# Patient Record
Sex: MALE | Race: WHITE
[De-identification: names, ages, dates, MRNs, and addresses within clinical notes are randomized per-mention and may not be internally consistent; named-entity substitution may affect disease eponyms.]

---

## 2017-03-01 NOTE — PDOC H&P
History of Present Illness


Admission Date/PCP: 


  03/01/17 18:33





  CHRISTIAN NATHAN





History of Present Illness: 


SHARON NGUYEN is a 61 year old male with past medical history of ulcerative 

colitis, diabetes mellitus, hyperlipidemia, neuropathy, anemia, previous CVA 

who presents to the emergency department with chest pain.  Patient reports that 

he was walking in [employer] when he began having stabbing chest pain that then 

turned and pressure complaint nausea and without diaphoresis or shortness of 

breath.  Patient reports that he began having shakes of that he couldn't stand.

  He then reports that he did not loose consciousness.  He reports he's been 

having dizzy spells that have been evaluated over the past year by both 

cardiology as well as neurology.  She reports a prior stress test approximately 

one year ago that was negative.  Wife reports that he's also had echocardiogram

, stress test, Holter monitor, EEG.  Patient is referred to the hospital 

service for evaluation of chest pain.








Past Medical History


Past Medical History: 


ulcerative colitis, diabetes mellitus, hyperlipidemia, neuropathy, anemia, 

previous CVA 


Cardiac Medical History: Reports: Hyperlipidema


Endocrine Medical History: Reports: Diabetes Mellitus Type 2


Musculoskeltal Medical History: Reports: Arthritis


Psychiatric Medical History: Reports: Depression





Past Surgical History


Past Surgical History: 


Eye surgery





Social History


Smoking Status: Current Some Day Smoker


Cigarettes Packs Per Day: 1


Frequency of Alcohol Use: None


Amount of Alcoholic Beverages Per Day: history of alcohol abuse quit in 1998


Hx Recreational Drug Use: No


Drugs: None


Hx Prescription Drug Abuse: No





- Advance Directive


Resuscitation Status: Full Code


Surrogate healthcare decision maker:: 


Barbara Nguyen, wife





Family History


Family History: CAD, DM, Malignancy


Parental Family History Reviewed: Yes


Children Family History Reviewed: Yes


Sibling(s) Family History Reviewed.: Yes





Medication/Allergy


Home Medications: 








Bupropion HCl [Wellbutrin Sr 100 mg Tablet] 300 mg PO Q12 03/01/17 


Cyanocobalamin (Vitamin B-12) [Vitamin B-12] 1,000 mcg SL DAILY 03/01/17 


Duloxetine HCl [Cymbalta] 60 mg PO TID 03/01/17 


Ergocalciferol (Vitamin D2) [Vitamin D2] 50,000 units PO HAGAN@1000 03/01/17 


Ferrous Sulfate [Feosol 325 mg Tablet] 325 mg PO BIDPCBS 03/01/17 


Fluticasone Propionate [Flonase Nasal Spray 50 Mcg/Spray 16 gm] 1 spray NASL 

DAILY 03/01/17 


Mercaptopurine [Purinethol 50 mg Tablet] 75 mg PO DAILY 03/01/17 


Mesalamine [Delzicol] 800 mg PO BID 03/01/17 


Metformin HCl [Metformin HCl ER] 1,000 mg PO BIDPCBS 03/01/17 


Metoclopramide HCl [Reglan] 5 mg PO TIDHS 03/01/17 


Omeprazole 20 mg PO Q12 03/01/17 


Oxycodone HCl/Acetaminophen [Oxycodone-Acetaminophen ] 1 each PO TIDP PRN 

03/01/17 


Pregabalin [Lyrica] 200 mg PO Q8 03/01/17 


Tamsulosin HCl [Flomax 0.4 mg Cap.sr] 0.4 mg PO Q12 03/01/17 








Allergies/Adverse Reactions: 


 





No Known Allergies Allergy (Verified 03/01/17 13:47)


 











Review of Systems


Constitutional: PRESENT: weakness.  ABSENT: chills, fever(s), headache(s), 

weight gain, weight loss


Eyes: ABSENT: visual disturbances


Ears: ABSENT: hearing changes


Cardiovascular: PRESENT: as per HPI, chest pain.  ABSENT: dyspnea on exertion, 

edema, orthropnea, palpitations


Respiratory: ABSENT: cough, dyspnea, hemoptysis, sputum


Gastrointestinal: PRESENT: constipation, diarrhea.  ABSENT: abdominal pain, 

bloating, hematemesis, hematochezia, melena, nausea, vomiting


Genitourinary: ABSENT: dysuria, hematuria


Musculoskeletal: ABSENT: joint swelling


Integumentary: ABSENT: rash, wounds


Neurological: PRESENT: dizziness, numbness, tremor(s).  ABSENT: abnormal gait, 

abnormal speech, confusion, focal weakness, syncope


Psychiatric: ABSENT: anxiety, depression, homidical ideation, suicidal ideation


Endocrine: ABSENT: cold intolerance, heat intolerance, polydipsia, polyuria


Hematologic/Lymphatic: ABSENT: easy bleeding, easy bruising





Physical Exam


Vital Signs: 


 











Temp Pulse Resp BP Pulse Ox


 


 97.6 F   69   14   141/85 H  95 


 


 03/01/17 18:34  03/01/17 18:34  03/01/17 20:31  03/01/17 20:46  03/01/17 20:46








 Intake & Output











 02/28/17 03/01/17 03/02/17





 06:59 06:59 06:59


 


Weight   74.1 kg











General appearance: PRESENT: no acute distress, well-developed, well-nourished


Head exam: PRESENT: atraumatic, normocephalic


Eye exam: PRESENT: conjunctiva pink, EOMI, PERRLA.  ABSENT: scleral icterus


Ear exam: PRESENT: normal external ear exam, TM's normal bilaterally


Mouth exam: PRESENT: dry mucosa, tongue midline


Teeth exam: PRESENT: poor dentation


Neck exam: ABSENT: JVD, lymphadenopathy, thyromegaly, tracheal deviation


Respiratory exam: PRESENT: clear to auscultation dayo, prolonged expiratory phas

, unlabored.  ABSENT: accessory muscle use, crackles, rales, rhonchi, tachypnea

, wheezes


Cardiovascular exam: PRESENT: RRR, +S1, +S2.  ABSENT: diastolic murmur, gallop, 

rubs, systolic murmur


Pulses: PRESENT: normal dorsalis pedis pul


Vascular exam: PRESENT: normal capillary refill


GI/Abdominal exam: PRESENT: normal bowel sounds, soft.  ABSENT: distended, firm

, guarding, mass, organolmegaly, rebound, rigid, tenderness


Rectal exam: PRESENT: deferred


Extremities exam: PRESENT: full ROM.  ABSENT: calf tenderness, clubbing, pedal 

edema


Neurological exam: PRESENT: alert, awake, oriented to person, oriented to place

, oriented to time, oriented to situation, CN II-XII grossly intact.  ABSENT: 

motor sensory deficit


Psychiatric exam: PRESENT: appropriate affect, normal mood.  ABSENT: homicidal 

ideation, suicidal ideation


Skin exam: PRESENT: dry, intact, warm.  ABSENT: cyanosis, rash





Results


Laboratory Results: 


 











  03/01/17





  19:15


 


CK-MB (CK-2)  0.31


 


Troponin I  < 0.012











Impressions: 


 





Chest X-Ray  03/01/17 00:00


IMPRESSION:  NO ACUTE RADIOGRAPHIC FINDING IN THE CHEST.


 








Head CT  03/01/17 00:00


IMPRESSION:  No acute intracranial abnormality identified.  Chronic 

microvascular ischemic disease changes noted in the supratentorial white 

matter.  Probable old lacunar infarct in the right frontal lobe.  Overall CTs 

not significant change from prior study.


 














Assessment & Plan





- Diagnosis


(1) Chest pain


Qualifiers: 


     Chest pain type: precordial pain        Qualified Code(s): R07.2 - 

Precordial pain  


Is this a current diagnosis for this admission?: YesPlan: 


We'll place patient on observation and rule out for acute coronary syndrome.  

Will obtain nuclear stress test tomorrow in light of patient's underlying 

diabetes, hyperlipidemia, tobacco abuse and will check an FLP.  Place patient 

on a blocker and lisinopril as tolerated.  Aspirin.  Oxygen.  Morphine.








(2) Ulcerative colitis


Qualifiers: 


     Ulcerative colitis location: unspecified ulcerative colitis location     

Digestive disease complication type: unspecified complication        Qualified 

Code(s): K51.919 - Ulcerative colitis, unspecified with unspecified 

complications  


Is this a current diagnosis for this admission?: YesPlan: 


Continue patient on mesalamine and mercaptopurine.








(3) Anemia


Qualifiers: 


     Anemia type: B12 deficiency     Vitamin B12 deficiency anemia type: 

unspecified B12 deficiency        Qualified Code(s): D51.9 - Vitamin B12 

deficiency anemia, unspecified  


Is this a current diagnosis for this admission?: YesPlan: 


Patient with known history of B-12 and iron deficiency anemia.


Home medications








(4) Chronic pain syndrome


Is this a current diagnosis for this admission?: YesPlan: 


Continue patient's home medications.  Patient reports underlying neuropathy.








(5) BPH (benign prostatic hyperplasia)


Qualifiers: 


     Prostatic enlargement morphology: unspecified morphology     Lower urinary 

tract symptom presence: presence of symptoms unspecified        Qualified Code(s

): N40.0 - Benign prostatic hyperplasia without lower urinary tract symptoms  


Is this a current diagnosis for this admission?: YesPlan: 


Continue patient's Flomax








(6) Diabetes mellitus


Qualifiers: 


     Diabetes mellitus type: type 2     Diabetes mellitus complication status: 

with neurologic complications     Diabetes mellitus complication detail: with 

unspecified neuropathy     Diabetes mellitus long term insulin use: without 

long term use        Qualified Code(s): E11.40 - Type 2 diabetes mellitus with 

diabetic neuropathy, unspecified  


Is this a current diagnosis for this admission?: YesPlan: 


Check hemoglobin A1c.  Siding scale insulin.  Diabetic diet.








(7) Hypomagnesemia


Is this a current diagnosis for this admission?: YesPlan: 


Likely secondary to chronic GI losses.  Will give IV magnesium.  Monitor for 

arrhythmia.











- Time


Time Spent: 50 to 70 Minutes


Medications reviewed and adjusted accordingly: Yes


Anticipated discharge: Home


Within: within 24 hours





- Inpatient Certification


Based on my medical assessment, after consideration of the patient's 

comorbidities, presenting symptoms, or acuity I expect that the services needed 

warrant INPATIENT care.: No


I certify that my determination is in accordance with my understanding of 

Medicare's requirements for reasonable and necessary INPATIENT services [42 CFR 

412.3e].: No


Post Hospital Care: D/C Planner Documentation

## 2017-03-01 NOTE — ER DOCUMENT REPORT
ED General





- General


Chief Complaint: Chest Pain


Stated Complaint: WEAKNESS


Time seen by provider: 14:11


Mode of Arrival: Wheelchair


Information source: Patient, Relative


Notes: 


This is a 61-year-old man with a history of diabetes, syncope that is brought 

into the emergency room after experiencing an episode of chest pain, nausea, 

generalized weakness.  The patient's wife states that they were at [employer] and 

she turned around when they were on line and the patient was holding his chest 

and stated that he had to "get to the truck".  Patient was noticed to have 

stuttering speech and a tremor to the right upper extremity (no loss of 

consciousness, was talking the whole time).  Patient's wife states that at 

baseline, he does ambulate with a cane, has an unsteady gait, is prone to 

fainting and has had tremors in the past (although not this bad).  Patient has 

had a neurology workup including an EEG which showed no evidence of seizures.  

He does have an appointment with a neurologist tomorrow.  They deny any fever, 

chills or vomiting.


TRAVEL OUTSIDE OF THE [place] IN LAST 30 DAYS: No





- HPI


Onset: Just prior to arrival


Onset/Duration: Sudden


Quality of pain: Achy


Severity: None


Pain Level: Denies


Associated symptoms: Chest pain.  denies: Fever, Shortness of breath


Exacerbated by: Denies


Relieved by: Denies


Similar symptoms previously: No


Recently seen / treated by doctor: No





- Related Data


Allergies/Adverse Reactions: 


 





No Known Allergies Allergy (Verified 03/01/17 13:47)


 








Home Medications: 


 Current Home Medications





Bupropion HCl [Wellbutrin Sr 100 mg Tablet] 300 mg PO Q12 03/01/17 [History]


Cyanocobalamin (Vitamin B-12) [Vitamin B-12] 1,000 mcg SL DAILY 03/01/17 [

History]


Duloxetine HCl [Cymbalta] 60 mg PO TID 03/01/17 [History]


Ergocalciferol (Vitamin D2) [Vitamin D2] 50,000 units PO HAGAN@1000 03/01/17 [

History]


Ferrous Sulfate [Feosol 325 mg Tablet] 325 mg PO BIDPCBS 03/01/17 [History]


Fluticasone Propionate [Flonase Nasal Spray 50 Mcg/Spray 16 gm] 1 spray NASL 

DAILY 03/01/17 [History]


Mercaptopurine [Purinethol 50 mg Tablet] 75 mg PO DAILY 03/01/17 [History]


Mesalamine [Delzicol] 800 mg PO BID 03/01/17 [History]


Metformin HCl [Metformin HCl ER] 1,000 mg PO BIDPCBS 03/01/17 [History]


Metoclopramide HCl [Reglan] 5 mg PO TIDHS 03/01/17 [History]


Omeprazole 20 mg PO Q12 03/01/17 [History]


Oxycodone HCl/Acetaminophen [Oxycodone-Acetaminophen ] 1 each PO TIDP PRN 

03/01/17 [History]


Pregabalin [Lyrica] 200 mg PO Q8 03/01/17 [History]


Tamsulosin HCl [Flomax 0.4 mg Cap.sr] 0.4 mg PO Q12 03/01/17 [History]











Past Medical History





- General


Information source: Patient





- Social History


Smoking Status: Current Every Day Smoker


Cigarette use (# per day): Yes - half a pack per day


Chew tobacco use (# tins/day): No


Smoking Education Provided: No


Frequency of alcohol use: None


Drug Abuse: None


Lives with: Family


Family History: None


Patient has suicidal ideation: No


Patient has homicidal ideation: No





- Past Medical History


Cardiac Medical History: Reports: Hx Hypercholesterolemia


Neurological Medical History: Reports: Other - Tremors, gait instability (

followed up by neurologist)


Endocrine Medical History: Reports: Hx Diabetes Mellitus Type 2


Renal/ Medical History: Reports: None


Malignancy Medical History: Reports None


GI Medical History: Reports: None


Musculoskeltal Medical History: Reports Hx Arthritis


Psychiatric Medical History: Reports: Hx Depression


Past Surgical History: Reports: Other - Laser surgery on the eyes, cataracts.





- Immunizations


Hx Diphtheria, Pertussis, Tetanus Vaccination: Yes





Review of Systems





- Review of Systems


Constitutional: denies: Chills, Fever


EENT: No symptoms reported


Cardiovascular: See HPI


Respiratory: No symptoms reported


Gastrointestinal: No symptoms reported


Genitourinary: No symptoms reported


Male Genitourinary: No symptoms reported


Musculoskeletal: No symptoms reported


Skin: No symptoms reported


Hematologic/Lymphatic: No symptoms reported


Neurological/Psychological: See HPI





Physical Exam





- Vital signs


Vitals: 


 











Resp Pulse Ox


 


 14   97 


 


 03/01/17 13:43  03/01/17 13:43











Notes: 


Physical exam:


 


GENERAL: 61-year-old man, alert, stuttering, answering questions.  He is 

oriented 3.


HEAD: Atraumatic, normocephalic.


EYES: Pupils equal round and reactive to light, extraocular movements intact, 

sclera anicteric, conjunctiva are normal.


ENT: TMs normal, nares patent, oropharynx clear without exudates.  Moist mucous 

membranes.


NECK: Normal range of motion, supple without lymphadenopathy or JVD.


LUNGS: Breath sounds clear to auscultation bilaterally and equal.  No wheezes 

rales or rhonchi.


HEART: Regular rate and rhythm without murmurs, rubs or gallops.


ABDOMEN: Soft, normoactive bowel sounds.  No tenderness to palpation.  No 

guarding, no rebound.  No masses appreciated.


EXTREMITIES: Normal range of motion, no pitting or edema.  No clubbing or 

cyanosis.


NEUROLOGICAL: Cranial nerves II through XII: Stuttering words, wife states his 

has slurred speech.  Moving all extremities.  Good .


PSYCH: Normal mood, normal affect.


SKIN: Warm, Dry, normal turgor, no rashes or lesions noted.





Course





- Vital Signs


Vital signs: 


 











Temp Pulse Resp BP Pulse Ox


 


 97.5 F   70   18   152/90 H  97 


 


 03/01/17 23:17  03/01/17 23:17  03/01/17 23:17  03/01/17 23:17  03/01/17 23:17














- Laboratory


Result Diagrams: 


 03/01/17 13:35





 03/01/17 13:35


Laboratory results interpreted by me: 


 











  03/01/17 03/01/17 03/01/17





  13:35 13:35 13:35


 


RBC  3.71 L  


 


Hgb  12.7 L  


 


Hct  36.9 L  


 


MCV  99 H  


 


MCH  34.2 H  


 


RDW  15.8 H  


 


Lymphocytes %  8.9 L  


 


Magnesium    1.2 L*


 


Creatine Kinase   38 L 














- Diagnostic Test


Radiology reviewed: Image reviewed, Reports reviewed - Chest x-ray shows no 

infiltrates. CT of the head shows no acute changes.





- EKG Interpretation by Me


Rate: Normal


Rhythm: NSR - EKG shows normal sinus rhythm with a ventricular rate of 95, no 

acute ST-T wave changes





Discharge





- Discharge


Clinical Impression: 


 chest pain, hypomagnesemia





Condition: Stable


Disposition: ADMITTED AS OBSERVATION


Admitting Provider: Hospitalist - Dr. Wallace


Unit Admitted: Telemetry

## 2017-03-01 NOTE — EKG REPORT
SEVERITY:- BORDERLINE ECG -

SINUS RHYTHM

PROBABLE LEFT ATRIAL ABNORMALITY

BORDERLINE LEFT AXIS DEVIATION

:

Confirmed by: Forrest Caceres MD 01-Mar-2017 20:08:14

## 2017-03-02 NOTE — DRAGON STRESS TEST REPORT
INTRAVENOUS LEXISCAN CARDIOLITE STRESS TEST USING SINGLE PHOTON EMMISION 
COMPUTERIZED TOMOGRAPHIC.



DATE OF PROCEDURE: March 2, 2017



INDICATION : Chest pain



CARDIAC RISK FACTORS: Diabetes, dyslipidemia, tobacco abuse, family history of 
CAD



RESTING EKG: Sinus rhythm, no Baseline ST segment changes noted



STRESS EKG: No significant changes noted with LexiScan bolus 



REASON FOR TERMINATION: Protocol.





PROCEDURE REPORT: Baseline heart rate 63 beats per minute with blood pressure 
of 158/95.  Patient had no significant complaints.  Heart rate at 2 minutes 
post bolus 78 with a blood pressure of 160/94.  3 minutes post bolus heart rate 
75 with blood pressure of 167/87.  No significant EKG changes were noted.  
Patient had no significant complaints during the procedure or postprocedure.



CONCLUSIONS: Normal EKG and hemodynamic response to IV LexiScan.







NUCLEAR DATA:

At rest the patient was given 10.38 millicuries of technetium 99 sestamibi 
injected intravenously.  As per protocol rest gated SPECT images were obtained.
  Subsequently the patient was given intravenous LexiScan at a dose of 0.4 mg 
in 5 mL intravenously, followed by flush with normal saline.  Subsequently the 
stress dose of 32.3 millicuries of technetium 99 sestamibi was injected 
intravenously.  As per protocol stress gated images were obtained.  



NUCLEAR INTERPRETATION: Both raw and processed data were used for 
interpretation.  Visual, qualitative, computer-generated quantitative data was 
used.  There was good myocardial uptake of technetium compound.  Motion 
artifact and soft tissue attenuations were noted.  Increased visceral uptake 
was noted.  No definitive areas of transient perfusion defect noted.  No 
definitive areas of fixed perfusion defect or scars noted.  Borderline 
decreased uptake is noted in the basal anterior wall and is felt to be related 
to motion artifact.



EKG gated imaging showed LV EF at 55 %, rest and stress gated EF similar 
visually.  T. I D. ratio was 0.94.  Lung heart ratio noted to be within normal 
limits 0.29.  No significant extracardiac and abnormal radiotracer activities 
were noted.  RV free wall uptake was noted to be WNL.



IMPRESSION: Also refer to comments under nuclear interpretation. Also test 
results needs to be interpreted in the context of pretest probability.





1.  There is no definitive scintigraphic evidence of LexiScan induced 
myocardial ischemia.

2.  There is no definitive scintigraphic evidence of myocardial infarction/scar.

3.  EKG gated imaging shows left ejection fraction of approximately 55 %. 

4.  Clinical correlation requested as occasionally single vessel disease or 
balanced ischemia could be missed. In approximately 10% of the cases Lexiscan 
may not cause adequate vasodilatory stress.



RECOMMENDATIONS:

Aggressive risk factor modification, medical therapy.

Clinical correlation with echocardiogram derived ejection fraction.

Inability to exercise by itself can lead to increased cardiovascular event 
risks.

Consider cardiology consultation if clinically indicated.

I AM AVAILABLE FOR CARDIOLOGY CONSULTATION AND FOLLOWUP IF REQUESTED BY PMD











Inder Ramirez M.D., FIONA

Consultant cardiologist,

Board certified in cardiovascular diseases, 

Nuclear cardiology, 

Echocardiography

Cardiac CT and cardiac MRI

Ph. [phone] 

Fax [phone]
[facility]

## 2017-03-02 NOTE — PDOC DISCHARGE SUMMARY
General





- Admit/Disc Date/PCP


Admission Date/Primary Care Provider: 


  03/01/17 18:33





  CHRISTIAN NATHAN





Discharge Date: 03/02/17





- Discharge Diagnosis


(1) Chest pain


Is this a current diagnosis for this admission?: Yes





(2) Ulcerative colitis


Is this a current diagnosis for this admission?: Yes





(3) Anemia


Is this a current diagnosis for this admission?: Yes





(4) Chronic pain syndrome


Is this a current diagnosis for this admission?: Yes





(5) BPH (benign prostatic hyperplasia)


Is this a current diagnosis for this admission?: Yes





(6) Diabetes mellitus


Is this a current diagnosis for this admission?: Yes





(7) Hypomagnesemia


Is this a current diagnosis for this admission?: Yes








- Additional Information


Resuscitation Status: Full Code


Discharge Diet: Cardiac, Diabetic


Discharge Activity: Activity As Tolerated


Home Medications: 








Cyanocobalamin (Vitamin B-12) [Vitamin B-12] 1,000 mcg SL DAILY 03/01/17 


Duloxetine HCl [Cymbalta] 60 mg PO TID 03/01/17 


Ergocalciferol (Vitamin D2) [Vitamin D2] 50,000 units PO HAGAN@1000 03/01/17 


Ferrous Sulfate [Feosol 325 mg Tablet] 325 mg PO BIDPCBS 03/01/17 


Fluticasone Propionate [Flonase Nasal Spray 50 Mcg/Spray 16 gm] 1 spray NASL 

DAILY 03/01/17 


Mercaptopurine [Purinethol 50 mg Tablet] 75 mg PO DAILY 03/01/17 


Mesalamine [Delzicol] 800 mg PO BID 03/01/17 


Metformin HCl [Metformin HCl ER] 1,000 mg PO BIDPCBS 03/01/17 


Metoclopramide HCl [Reglan] 5 mg PO TIDHS 03/01/17 


Omeprazole 20 mg PO Q12 03/01/17 


Oxycodone HCl/Acetaminophen [Oxycodone-Acetaminophen ] 1 each PO TIDP PRN 

03/01/17 


Pregabalin [Lyrica] 200 mg PO Q8 03/01/17 


Tamsulosin HCl [Flomax 0.4 mg Cap.sr] 0.4 mg PO Q12 03/01/17 











History of Present Illness


History of Present Illness: 


SHARON KUMAR is a 61 year old male with past medical history of ulcerative 

colitis, diabetes mellitus, hyperlipidemia, neuropathy, anemia, previous CVA 

who presents to the emergency department with chest pain.  Patient reports that 

he was walking in [place] when he began having stabbing chest pain that then 

turned and pressure complaint nausea and without diaphoresis or shortness of 

breath.  Patient reports that he began having shakes of that he couldn't stand.

  He then reports that he did not loose consciousness.  He reports he's been 

having dizzy spells that have been evaluated over the past year by both 

cardiology as well as neurology.  She reports a prior stress test approximately 

one year ago that was negative.  Wife reports that he's also had echocardiogram

, stress test, Holter monitor, EEG.  Patient is referred to the hospital 

service for evaluation of chest pain.








Hospital Course


Hospital Course: 


Patient was admitted and ruled out for acute coronary syndrome.  Patient had a 

nuclear stress test today which was found to be negative.  Patient had 

improvement of his weakness with repletion of his magnesium.  Discussed with 

patient importance of a good diet and stopping smoking.  Discussed patient risk 

factor modification.  For patient's extensive history of dizziness, have 

discussed with patient continuing his outpatient workup.  Patient was noted to 

be on 600 mg of Wellbutrin daily patient was advised to take more than no more 

than 450 mg total in 24 hours.  Patient was discharged in stable condition to 

his family.





Physical Exam


Vital Signs: 


 











Temp Pulse Resp BP Pulse Ox


 


 97.7 F   89   20   151/69 H  97 


 


 03/02/17 16:28  03/02/17 16:28  03/02/17 16:28  03/02/17 16:28  03/02/17 16:28








 Intake & Output











 03/01/17 03/02/17 03/03/17





 06:59 06:59 06:59


 


Intake Total  240 


 


Output Total  800 


 


Balance  -560 


 


Weight  74.1 kg 











Exam: 


General: Awake alert and oriented x3, no acute respiratory distress


HEENT: AT/NC, PERRL, EOMI, oropharynx is moist, pink, no scleral icterus, no 

conjunctival injection


Neck: No JVD, trachea midline


Chest: Clear to auscultation bilaterally, no wheezes rhonchi or rales


CV: Regular rate and rhythm, normal S1 and S2, no murmur, rub, or gallop


Abdomen: Soft, nontender to palpation, nondistended, active bowel sounds; no 

rebound, rigidity, or guarding


Extremities: No cyanosis, clubbing or edema


Neuro: Cranial nerves II through XII are grossly intact without focal deficits; 

awake alert and oriented x3


Psych: Unusual mood and affect





Results


Laboratory Results: 


 





 03/02/17 07:03 





 











  03/02/17





  07:03


 


Sodium  141.1


 


Potassium  4.7


 


Chloride  105


 


Carbon Dioxide  24


 


Anion Gap  12


 


BUN  17


 


Creatinine  0.98


 


Est GFR ( Amer)  > 60


 


Est GFR (Non-Af Amer)  > 60


 


Glucose  121 H


 


Calcium  9.7


 


Magnesium  1.9


 


Triglycerides  219 H


 


Cholesterol  152.09


 


LDL Cholesterol Direct  82


 


VLDL Cholesterol  43.8 H


 


HDL Cholesterol  38 L








 











  03/01/17 03/02/17 03/02/17





  19:15 01:01 07:03


 


Creatine Kinase    40 L


 


CK-MB (CK-2)  0.31  0.31 


 


Troponin I  < 0.012  < 0.012 














  03/02/17





  07:03


 


Creatine Kinase 


 


CK-MB (CK-2)  0.31


 


Troponin I  < 0.012











Impressions: 


 





Chest X-Ray  03/01/17 00:00


IMPRESSION:  NO ACUTE RADIOGRAPHIC FINDING IN THE CHEST.


 








Head CT  03/01/17 00:00


IMPRESSION:  No acute intracranial abnormality identified.  Chronic 

microvascular ischemic disease changes noted in the supratentorial white 

matter.  Probable old lacunar infarct in the right frontal lobe.  Overall CTs 

not significant change from prior study.


 














Qualifiers


**PATEINT BEING DISCHARGED WITH ANY OF THE FOLLOWING DIAGNOSIS?: No





Plan


Time Spent: Greater than 30 Minutes

## 2017-03-02 NOTE — EKG REPORT
SEVERITY:- ABNORMAL ECG -

SINUS RHYTHM

SUPRAVENTRICULAR BIGEMINY

:

Confirmed by: Forrest Caceres MD 02-Mar-2017 08:27:33

## 2017-03-03 NOTE — EKG REPORT
SEVERITY:- OTHERWISE NORMAL ECG -

SINUS RHYTHM

BORDERLINE LEFT AXIS DEVIATION

:

Confirmed by: Forrest Caceres MD 03-Mar-2017 09:06:10

## 2017-04-21 ENCOUNTER — HOSPITAL ENCOUNTER (OUTPATIENT)
Dept: HOSPITAL 62 - RAD | Age: 62
End: 2017-04-21
Attending: FAMILY MEDICINE
Payer: COMMERCIAL

## 2017-04-21 DIAGNOSIS — F17.210: ICD-10-CM

## 2017-04-21 DIAGNOSIS — Z12.2: Primary | ICD-10-CM

## 2017-04-21 PROCEDURE — G0297 LDCT FOR LUNG CA SCREEN: HCPCS

## 2018-10-04 ENCOUNTER — HOSPITAL ENCOUNTER (OUTPATIENT)
Dept: HOSPITAL 62 - RAD | Age: 63
End: 2018-10-04
Attending: INTERNAL MEDICINE
Payer: MEDICARE

## 2018-10-04 DIAGNOSIS — K31.84: Primary | ICD-10-CM

## 2018-10-04 PROCEDURE — A9541 TC99M SULFUR COLLOID: HCPCS

## 2018-10-04 PROCEDURE — 78264 GASTRIC EMPTYING IMG STUDY: CPT

## 2018-10-04 NOTE — RADIOLOGY REPORT (SQ)
EXAM DESCRIPTION:  NM GASTRIC EMPTYING STUDY



COMPLETED DATE/TIME:  10/4/2018 12:43 pm



REASON FOR STUDY:  GASTROPARESIS (K31.84) K31.84  GASTROPARESIS



COMPARISON:  1/18/2017 gastric emptying study



RADIONUCLIDE AND DOSE:  2.1  millicuries Tc-99m Sulfur Colloid.

A wide variety of solid foods have been used.

The route of agent administration: Oral.



TECHNIQUE:  1 minute serial static imaging performed at time of meal, 1 hour, 2 hours, 3 hours, and 4
 hours as needed.  Once stomach reaches 90% emptying, the test is complete. Image intensity values pl
otted with respect to time with linear regression algorithm.



LIMITATIONS:  None.



FINDINGS:  Patient was observed for 4 hours.

Immediate post meal serves as baseline.

Gastric emptying at 30 minutes was 12%

Gastric emptying at 60 minutes was 23%

Gastric emptying at 90 minutes was 35%.

Gastric emptying at 120 minutes was 46%

Gastric emptying at 240 minutes was 92%.

Normal values:

60 minutes:  30-90% retained.  If less than 30%, abnormally rapid emptying.  If greater than 90%, del
ayed gastric emptying.

120 minutes:  <60% retained.  If greater than 60%, delayed gastric emptying.

240 minutes:  <10% retained.  If greater than 10%, delayed gastric emptying.



IMPRESSION:  NORMAL GASTRIC EMPTYING.



TECHNICAL DOCUMENTATION:  JOB ID:  [id number]

 2011 [employer]- All Rights Reserved                           rev-5/18



Reading location - IP/workstation name: [email or web address]